# Patient Record
Sex: MALE | Race: ASIAN | URBAN - METROPOLITAN AREA
[De-identification: names, ages, dates, MRNs, and addresses within clinical notes are randomized per-mention and may not be internally consistent; named-entity substitution may affect disease eponyms.]

---

## 2023-04-26 ENCOUNTER — OFFICE VISIT (OUTPATIENT)
Dept: URBAN - METROPOLITAN AREA CLINIC 74 | Facility: CLINIC | Age: 38
End: 2023-04-26

## 2023-05-05 ENCOUNTER — OFFICE VISIT (OUTPATIENT)
Dept: URBAN - METROPOLITAN AREA TELEHEALTH 2 | Facility: TELEHEALTH | Age: 38
End: 2023-05-05

## 2023-05-11 ENCOUNTER — OFFICE VISIT (OUTPATIENT)
Dept: URBAN - METROPOLITAN AREA CLINIC 40 | Facility: CLINIC | Age: 38
End: 2023-05-11

## 2023-06-21 ENCOUNTER — OFFICE VISIT (OUTPATIENT)
Dept: URBAN - METROPOLITAN AREA CLINIC 40 | Facility: CLINIC | Age: 38
End: 2023-06-21
Payer: COMMERCIAL

## 2023-06-21 ENCOUNTER — WEB ENCOUNTER (OUTPATIENT)
Dept: URBAN - METROPOLITAN AREA CLINIC 40 | Facility: CLINIC | Age: 38
End: 2023-06-21

## 2023-06-21 VITALS
HEIGHT: 71 IN | HEART RATE: 94 BPM | SYSTOLIC BLOOD PRESSURE: 120 MMHG | DIASTOLIC BLOOD PRESSURE: 78 MMHG | WEIGHT: 178 LBS | BODY MASS INDEX: 24.92 KG/M2

## 2023-06-21 DIAGNOSIS — K64.8 INTERNAL HEMORRHOIDS: ICD-10-CM

## 2023-06-21 DIAGNOSIS — K62.5 RECTAL BLEEDING: ICD-10-CM

## 2023-06-21 DIAGNOSIS — K21.9 CHRONIC GERD: ICD-10-CM

## 2023-06-21 PROBLEM — 235595009: Status: ACTIVE | Noted: 2023-06-21

## 2023-06-21 PROCEDURE — 99204 OFFICE O/P NEW MOD 45 MIN: CPT | Performed by: INTERNAL MEDICINE

## 2023-06-21 RX ORDER — PANTOPRAZOLE SODIUM 40 MG/1
1 TABLET TABLET, DELAYED RELEASE ORAL ONCE A DAY
Qty: 30 TABLET | Refills: 1 | OUTPATIENT
Start: 2023-06-21

## 2023-06-21 NOTE — HPI-TODAY'S VISIT:
Mr. Portillo is a 37-year-old [place] male presents for consult with complaints of episode of bright red blood per rectum a month ago as well as reflux, heartburn for period of 2 years worsening over the past year.  He denies family history of colon cancer or colon polyps, gastric cancer.  He states that his sister has piles also known as hemorrhoids and that she was giving a pill which resolved her issues.  He states that for the past year now when he eats just about anything he eats causes him to have nausea reflux heartburn immediately after eating.  He denies vomiting or abdominal pain.  He denies NSAID use.  He denies smoking, alcohol.  He states he does take Tums on occasion and tried over-the-counter omeprazole but was inconsistent with its use.  He endorses the diet onion English tea cheese and sometimes Bojangles twice a week.  He states that he has approximately 2 stools a day but they are hard and firm, he will have occasional spot of blood on toilet paper but denies rectal pain or itching.  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

## 2023-06-21 NOTE — PHYSICAL EXAM RECTAL:
([place], [employer] chaperones) normal tone, no external hemorrhoids, possible skin tag with reducible, nonthrombosed IH at LL position . No fissure or bleeding.

## 2023-07-31 ENCOUNTER — DASHBOARD ENCOUNTERS (OUTPATIENT)
Age: 38
End: 2023-07-31

## 2023-08-03 ENCOUNTER — OFFICE VISIT (OUTPATIENT)
Dept: URBAN - METROPOLITAN AREA CLINIC 40 | Facility: CLINIC | Age: 38
End: 2023-08-03

## 2023-08-03 RX ORDER — PANTOPRAZOLE SODIUM 40 MG/1
1 TABLET TABLET, DELAYED RELEASE ORAL ONCE A DAY
Qty: 30 TABLET | Refills: 1 | OUTPATIENT

## 2023-08-03 RX ORDER — PANTOPRAZOLE SODIUM 40 MG/1
1 TABLET TABLET, DELAYED RELEASE ORAL ONCE A DAY
Qty: 30 TABLET | Refills: 1 | Status: ACTIVE | COMMUNITY
Start: 2023-06-21

## 2023-08-03 NOTE — HPI-TODAY'S VISIT:
Mr. Portillo is a 37-year-old [place] male seen 6/21/23 with complaints of episode of bright red blood per rectum a month ago as well as reflux, heartburn for period of 2 years worsening over the past year.  He denies family history of colon cancer or colon polyps, gastric cancer.  He states that his sister has piles also known as hemorrhoids and that she was giving a pill which resolved her issues.  He states that for the past year now when he eats just about anything he eats causes him to have nausea reflux heartburn immediately after eating.  He denies vomiting or abdominal pain.  He denies NSAID use.  He denies smoking, alcohol.  He states he does take Tums on occasion and tried over-the-counter omeprazole but was inconsistent with its use.  He endorses the diet onion English tea cheese and sometimes Bojangles twice a week.  He states that he has approximately 2 stools a day but they are hard and firm, he will have occasional spot of blood on toilet paper but denies rectal pain or itching.  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

## 2023-08-10 ENCOUNTER — OFFICE VISIT (OUTPATIENT)
Dept: URBAN - METROPOLITAN AREA CLINIC 40 | Facility: CLINIC | Age: 38
End: 2023-08-10

## 2023-08-10 RX ORDER — PANTOPRAZOLE SODIUM 40 MG/1
1 TABLET TABLET, DELAYED RELEASE ORAL ONCE A DAY
Qty: 30 TABLET | Refills: 1 | Status: ACTIVE | COMMUNITY

## 2023-08-10 RX ORDER — PANTOPRAZOLE SODIUM 40 MG/1
1 TABLET TABLET, DELAYED RELEASE ORAL ONCE A DAY
Qty: 30 TABLET | Refills: 1 | OUTPATIENT

## 2023-08-10 NOTE — HPI-TODAY'S VISIT:
Mr. Portillo is a 37-year-old [place] male who was seen 6/21/23 with complaints of episode of bright red blood per rectum a month ago as well as reflux, heartburn for period of 2 years worsening over the past year.  He denies family history of colon cancer or colon polyps, gastric cancer.  He states that his sister has piles also known as hemorrhoids and that she was giving a pill which resolved her issues.  He states that for the past year now when he eats just about anything he eats causes him to have nausea reflux heartburn immediately after eating.  He denies vomiting or abdominal pain.  He denies NSAID use.  He denies smoking, alcohol.  He states he does take Tums on occasion and tried over-the-counter omeprazole but was inconsistent with its use.  He endorses the diet onion English tea cheese and sometimes Bojangles twice a week.  He states that he has approximately 2 stools a day but they are hard and firm, he will have occasional spot of blood on toilet paper but denies rectal pain or itching.  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